# Patient Record
Sex: MALE | Race: WHITE
[De-identification: names, ages, dates, MRNs, and addresses within clinical notes are randomized per-mention and may not be internally consistent; named-entity substitution may affect disease eponyms.]

---

## 2017-06-12 ENCOUNTER — HOSPITAL ENCOUNTER (OUTPATIENT)
Dept: HOSPITAL 45 - C.LAB1850 | Age: 42
End: 2017-06-12
Attending: NURSE PRACTITIONER
Payer: COMMERCIAL

## 2017-06-12 DIAGNOSIS — R19.7: Primary | ICD-10-CM

## 2017-06-12 LAB
ALBUMIN/GLOB SERPL: 1 {RATIO} (ref 0.9–2)
ALP SERPL-CCNC: 113 U/L (ref 45–117)
ALT SERPL-CCNC: 120 U/L (ref 12–78)
ANION GAP SERPL CALC-SCNC: 10 MMOL/L (ref 3–11)
AST SERPL-CCNC: 64 U/L (ref 15–37)
BASOPHILS # BLD: 0.05 K/UL (ref 0–0.2)
BASOPHILS NFR BLD: 0.5 %
BUN SERPL-MCNC: 6 MG/DL (ref 7–18)
BUN/CREAT SERPL: 6.4 (ref 10–20)
CALCIUM SERPL-MCNC: 8.5 MG/DL (ref 8.5–10.1)
CHLORIDE SERPL-SCNC: 105 MMOL/L (ref 98–107)
CO2 SERPL-SCNC: 25 MMOL/L (ref 21–32)
COMPLETE: YES
CREAT SERPL-MCNC: 0.91 MG/DL (ref 0.6–1.4)
CRP SERPL-MCNC: 0.75 MG/DL (ref 0–0.29)
EOSINOPHIL NFR BLD AUTO: 224 K/UL (ref 130–400)
GLOBULIN SER-MCNC: 3.7 GM/DL (ref 2.5–4)
GLUCOSE SERPL-MCNC: 105 MG/DL (ref 70–99)
HCT VFR BLD CALC: 46.5 % (ref 42–52)
IG%: 0.2 %
IMM GRANULOCYTES NFR BLD AUTO: 27.4 %
LYMPHOCYTES # BLD: 2.63 K/UL (ref 1.2–3.4)
MCH RBC QN AUTO: 30.7 PG (ref 25–34)
MCHC RBC AUTO-ENTMCNC: 35.5 G/DL (ref 32–36)
MCV RBC AUTO: 86.4 FL (ref 80–100)
MONOCYTES NFR BLD: 5.1 %
NEUTROPHILS # BLD AUTO: 1.6 %
NEUTROPHILS NFR BLD AUTO: 65.2 %
PMV BLD AUTO: 9.1 FL (ref 7.4–10.4)
POTASSIUM SERPL-SCNC: 3.8 MMOL/L (ref 3.5–5.1)
RBC # BLD AUTO: 5.38 M/UL (ref 4.7–6.1)
SODIUM SERPL-SCNC: 140 MMOL/L (ref 136–145)
TSH SERPL-ACNC: 1.87 UIU/ML (ref 0.3–4.5)
WBC # BLD AUTO: 9.6 K/UL (ref 4.8–10.8)

## 2017-06-15 LAB
IGA SERPL-MCNC: 239 MG/DL (ref 81–463)
TIS TRANS IGA: 1 U/ML (ref ?–4)

## 2017-06-26 ENCOUNTER — HOSPITAL ENCOUNTER (OUTPATIENT)
Dept: HOSPITAL 45 - C.GI | Age: 42
Discharge: HOME | End: 2017-06-26
Attending: INTERNAL MEDICINE
Payer: COMMERCIAL

## 2017-06-26 VITALS
HEIGHT: 70 IN | BODY MASS INDEX: 41.6 KG/M2 | BODY MASS INDEX: 41.6 KG/M2 | WEIGHT: 290.61 LBS | WEIGHT: 290.61 LBS | HEIGHT: 70 IN

## 2017-06-26 VITALS — HEART RATE: 70 BPM | DIASTOLIC BLOOD PRESSURE: 72 MMHG | SYSTOLIC BLOOD PRESSURE: 127 MMHG | OXYGEN SATURATION: 98 %

## 2017-06-26 DIAGNOSIS — Z98.818: ICD-10-CM

## 2017-06-26 DIAGNOSIS — E78.5: ICD-10-CM

## 2017-06-26 DIAGNOSIS — E66.9: ICD-10-CM

## 2017-06-26 DIAGNOSIS — Z87.891: ICD-10-CM

## 2017-06-26 DIAGNOSIS — D12.3: ICD-10-CM

## 2017-06-26 DIAGNOSIS — H40.9: ICD-10-CM

## 2017-06-26 DIAGNOSIS — Z98.52: ICD-10-CM

## 2017-06-26 DIAGNOSIS — K52.9: Primary | ICD-10-CM

## 2017-06-26 DIAGNOSIS — K64.8: ICD-10-CM

## 2017-06-26 DIAGNOSIS — K62.1: ICD-10-CM

## 2017-06-26 NOTE — DISCHARGE INSTRUCTIONS
Endoscopy Patient Instructions


Date / Procedure(s) Performed


Jun 26, 2017.


Colonoscopy





Allergy Information


Coded Allergies:  


     No Known Allergies (Unverified , 6/26/17)





Discharge Date / Findings


Jun 26, 2017.


Colon polyps


Internal Hemorrhoids


Random colon biopsies


Stool studies collected





Medication Instructions


OK to resume all medications today as prescribed





Reported Home Medications








 Medications  Dose


 Route/Sig


 Max Daily Dose Days Date Category


 


 No Active


 Prescriptions or


 Reported


 Medications  


 


     Rx











Provider Instructions





Activity Restrictions





-  No exercising or heavy lifting for 24 hours. 


-  Do not drink alcohol the day of the procedure.


-  Do not drive a car or operate machinery until the day after the procedure.


-  Do not make any important decisions or sign important papers in 24 hours 

after the procedure.





Following Day:





-  Return to full activity which may include returning to work/school.





Diet





Start your diet with liquids and light foods (jello, soup, juice, toast).  Then 

eat your usual diet if not nauseated.





Treatment For Common After Affects





For mild abdominal pain, bloating, or excessive gas:





-  Rest


-  Eat lightly


-  Lie on right side





Follow-Up Information


Follow-up with Dr. Condon as scheduled





Anesthesia Information





What You Should Know





You have had a procedure that required some medicine to reduce anxiety and 

discomfort. This treatment is called moderate sedation.  


After receiving the treatment, you may be sleepy, but you will be able to 

breathe on your own.  The effects of the treatment may last for several hours.








Follow these instructions along with Activity/Diet recommendations noted above:





*  Do NOT do anything where dizziness or clumsiness would be dangerous.





*  Rest quietly at home today, then you can be up and about tomorrow.





*  Have a responsible person stay with you the rest of today.





*  You may have had an I.V. today.  If so, you may take the dressing off later 

today.





Recommendations


 


Call your doctor if:





*  Trouble breathing 





*  Continuous vomiting for more than 24 hours








*  Temperature above 101 degrees





*  Severe abdominal pain or bloating





*  Pain not relieved by pain medicine ordered





*  There is increased drainage or redness from any incision





*  A large amount of rectal bleeding greater than 2-3 tablespoons. 


   (If you had a polyp/s removed or have hemorrhoids, a small amount of blood -


    from the rectum is to be expected.)





*  You have any unanswered questions or concerns.








IN THE EVENT OF A SERIOUS EMERGENCY, GO TO THE NEAREST EMERGENCY ROOM








       Your discharge instructions were prepared by provider Nate Capone.





 Patient Instructions Signature Page














Guillermo Shaffer 











Patient (or Guardian) Signature/Date:____________________________________ I 

have read and understand the instructions given to me by my caregivers.








Caregiver/RN/Doctor Signature/Date:____________________________________











The above-named patient and/or guardian has received patient instructions on 

this date.





























+  Original Patient Signature Page (only) stays with chart.  Please make copy 

for patient.

## 2017-06-26 NOTE — ANESTHESIOLOGY PROGRESS NOTE
Anesthesia Post Op Note


Date & Time


Jun 26, 2017 at 12:50





Vital Signs


Pain Intensity:  0





Vital Signs Past 12 Hours








  Date Time  Temp Pulse Resp B/P (MAP) Pulse Ox O2 Delivery O2 Flow Rate FiO2


 


6/26/17 12:40  70 18 118/86 (97) 97 Room Air  


 


6/26/17 12:25  76 18 119/65 (83) 95 Room Air  


 


6/26/17 12:25  76 18 119/65 (83) 96 Room Air  


 


6/26/17 10:57 36.5 84 20 149/92 (111) 96 Room Air  











Notes


Mental Status:  alert / awake / arousable, participated in evaluation


Pt Amnestic to Procedure:  Yes


Nausea / Vomiting:  adequately controlled


Pain:  adequately controlled


Airway Patency, RR, SpO2:  stable & adequate


BP & HR:  stable & adequate


Hydration State:  stable & adequate


Anesthetic Complications:  no major complications apparent

## 2017-06-26 NOTE — ENDO HISTORY AND PHYSICAL
History & Physical


Date of Service:


Jun 26, 2017.


Chief Complaint:


Diarrhea


Referring Physician:


Dr. Condon


History of Present Illness


43 yo CM who presents for colonoscopy secondary to diarrhea.





Past Surgical History


Hx Cardiac Surgery:  No


Hx Internal Defibrillator:  No


Hx Pacemaker:  No


Hx Abdominal Surgery:  No


Hx of Implantable Prosthesis:  No


Hx Post-Op Nausea and Vomiting:  No


Hx Cancer Surgery:  No


Hx Thoracic Surgery:  No


Hx Orthopedic:  No


Hx Urinary Tract Surgery:  Yes (VASECTOMY)





Family History


None





Social History


Smoking Status:  Former Smoker


Hx Substance Use:  No


Hx Alcohol Use:  No





Allergies


Coded Allergies:  


     No Known Allergies (Unverified , 6/26/17)





Current Medications





Reported Home Medications








 Medications  Dose


 Route/Sig


 Max Daily Dose Days Date Category


 


 No Active


 Prescriptions or


 Reported


 Medications  


 


     Rx











Vital Signs


Weight (Kilograms):  131.82


Height (Feet):  5


Height (Inches):  10











  Date Time  Temp Pulse Resp B/P (MAP) Pulse Ox O2 Delivery O2 Flow Rate FiO2


 


6/26/17 10:57 36.5 84 20 149/92 (111) 96 Room Air  











Physical Exam


General Appearance:  WD/WN, no apparent distress


Respiratory/Chest:  


   Auscultation:  breath sounds normal


Cardiovascular:  


   Heart Auscultation:  RRR


Abdomen:  


   Bowel Sounds:  normal


   Inspection & Palpation:  soft, non-distended, no tenderness, guarding & 

rebound





Assessment and Plan


Assessment:


43 yo CM who presents for colonoscopy secondary to diarrhea.








Plan:


Proceed with colonoscopy.

## 2017-06-26 NOTE — GI REPORT
Procedure Date: 6/26/2017 12:03 PM

Procedure:            Colonoscopy

Indications:          Chronic diarrhea

Medicines:            Monitored Anesthesia Care

Complications:        No immediate complications.

Estimated Blood Loss: Estimated blood loss: none.

Procedure:            Pre-Anesthesia Assessment:

                      - Prior to the procedure, a History and Physical was 

                      performed, and patient medications and allergies were 

                      reviewed. The patient's tolerance of previous 

                      anesthesia was also reviewed. The risks and benefits of 

                      the procedure and the sedation options and risks were 

                      discussed with the patient. All questions were 

                      answered, and informed consent was obtained. Prior 

                      Anticoagulants: The patient has taken no previous 

                      anticoagulant or antiplatelet agents. ASA Grade 

                      Assessment: III - A patient with severe systemic 

                      disease. After reviewing the risks and benefits, the 

                      patient was deemed in satisfactory condition to undergo 

                      the procedure.

                      After I obtained informed consent, the scope was passed 

                      under direct vision. Throughout the procedure, the 

                      patient's blood pressure, pulse, and oxygen saturations 

                      were monitored continuously. The scope was introduced 

                      through the anus and advanced to the terminal ileum. 

                      The colonoscopy was performed without difficulty. The 

                      patient tolerated the procedure well. The quality of 

                      the bowel preparation was good. The terminal ileum, 

                      ileocecal valve, appendiceal orifice, and rectum were 

                      photographed.

Findings:

     Two sessile polyps were found in the rectum and in the transverse colon. 

     The polyps were 5 to 7 mm in size. These polyps were removed with a hot 

     snare. Resection and retrieval were complete.

     Non-bleeding internal hemorrhoids were found during retroflexion. The 

     hemorrhoids were small.

     Several random biopsies were obtained with cold forceps for histology in 

     the entire colon.

     Fluid aspiration for cytology was performed in the entire colon.

Impression:           - Two 5 to 7 mm polyps in the rectum and in the 

                      transverse colon, removed with a hot snare. Resected 

                      and retrieved.

                      - Non-bleeding internal hemorrhoids.

                      - Several random biopsies were obtained in the entire 

                      colon.

                      - Fluid aspiration was performed.

Recommendation:       - Resume previous diet.

                      - Continue present medications.

                      - Await pathology results.

                      - Repeat colonoscopy for surveillance based on 

                      pathology results.

                      - Return to primary care physician as previously 

                      scheduled.

Nate Capone DO

6/26/2017 12:34:47 PM

This report has been signed electronically.

Note Initiated On: 6/26/2017 12:03 PM

     I attest to the content of the Intraoperative Record and orders 

     documented therein, exceptions below

## 2018-03-13 ENCOUNTER — HOSPITAL ENCOUNTER (EMERGENCY)
Dept: HOSPITAL 45 - C.EDB | Age: 43
Discharge: HOME | End: 2018-03-13
Payer: COMMERCIAL

## 2018-03-13 VITALS
HEIGHT: 70 IN | BODY MASS INDEX: 41.28 KG/M2 | WEIGHT: 288.36 LBS | BODY MASS INDEX: 41.28 KG/M2 | HEIGHT: 70 IN | WEIGHT: 288.36 LBS

## 2018-03-13 VITALS — SYSTOLIC BLOOD PRESSURE: 118 MMHG | OXYGEN SATURATION: 94 % | HEART RATE: 66 BPM | DIASTOLIC BLOOD PRESSURE: 77 MMHG

## 2018-03-13 VITALS — TEMPERATURE: 98.06 F

## 2018-03-13 VITALS — OXYGEN SATURATION: 90 %

## 2018-03-13 DIAGNOSIS — Z91.030: ICD-10-CM

## 2018-03-13 DIAGNOSIS — R07.9: Primary | ICD-10-CM

## 2018-03-13 DIAGNOSIS — Z87.891: ICD-10-CM

## 2018-03-13 LAB
ALBUMIN SERPL-MCNC: 4 GM/DL (ref 3.4–5)
ALP SERPL-CCNC: 119 U/L (ref 45–117)
ALT SERPL-CCNC: 74 U/L (ref 12–78)
AST SERPL-CCNC: 46 U/L (ref 15–37)
BASOPHILS # BLD: 0.06 K/UL (ref 0–0.2)
BASOPHILS NFR BLD: 0.7 %
BUN SERPL-MCNC: 5 MG/DL (ref 7–18)
CALCIUM SERPL-MCNC: 9.1 MG/DL (ref 8.5–10.1)
CO2 SERPL-SCNC: 27 MMOL/L (ref 21–32)
CREAT SERPL-MCNC: 1.01 MG/DL (ref 0.6–1.4)
EOS ABS #: 0.22 K/UL (ref 0–0.5)
EOSINOPHIL NFR BLD AUTO: 217 K/UL (ref 130–400)
GLUCOSE SERPL-MCNC: 127 MG/DL (ref 70–99)
HCT VFR BLD CALC: 48.8 % (ref 42–52)
HGB BLD-MCNC: 17.6 G/DL (ref 14–18)
IG#: 0.02 K/UL (ref 0–0.02)
IMM GRANULOCYTES NFR BLD AUTO: 28.6 %
INR PPP: 1 (ref 0.9–1.1)
LIPASE: 168 U/L (ref 73–393)
LYMPHOCYTES # BLD: 2.46 K/UL (ref 1.2–3.4)
MCH RBC QN AUTO: 30.6 PG (ref 25–34)
MCHC RBC AUTO-ENTMCNC: 36.1 G/DL (ref 32–36)
MCV RBC AUTO: 84.9 FL (ref 80–100)
MONO ABS #: 0.49 K/UL (ref 0.11–0.59)
MONOCYTES NFR BLD: 5.7 %
NEUT ABS #: 5.36 K/UL (ref 1.4–6.5)
NEUTROPHILS # BLD AUTO: 2.6 %
NEUTROPHILS NFR BLD AUTO: 62.2 %
PMV BLD AUTO: 8.8 FL (ref 7.4–10.4)
POTASSIUM SERPL-SCNC: 3.7 MMOL/L (ref 3.5–5.1)
PROT SERPL-MCNC: 7.7 GM/DL (ref 6.4–8.2)
PTT PATIENT: 26 SECONDS (ref 21–31)
RED CELL DISTRIBUTION WIDTH CV: 13.5 % (ref 11.5–14.5)
RED CELL DISTRIBUTION WIDTH SD: 41.3 FL (ref 36.4–46.3)
SODIUM SERPL-SCNC: 136 MMOL/L (ref 136–145)
WBC # BLD AUTO: 8.61 K/UL (ref 4.8–10.8)

## 2018-03-13 NOTE — DIAGNOSTIC IMAGING REPORT
CHEST ONE VIEW PORTABLE



CLINICAL HISTORY: CHEST PAIN pain



COMPARISON STUDY:  5 2014



FINDINGS: Mild cardiomegaly. Lungs are clear. Diaphragms smooth. 



IMPRESSION:  Mild cardiomegaly. Otherwise negative study. 











The above report was generated using voice recognition software.  It may contain

grammatical, syntax or spelling errors.









Electronically signed by:  Shin Lamas M.D.

3/13/2018 10:00 AM



Dictated Date/Time:  3/13/2018 10:00 AM

## 2018-03-13 NOTE — EMERGENCY ROOM VISIT NOTE
History


Report prepared by Robin:  Paco Rosenbaum


Under the Supervision of:  Dr. Ramakrishna Hu D.O.


First contact with patient:  09:23


Chief Complaint:  CHEST PAIN


Stated Complaint:  CHEST PAIN


Nursing Triage Summary:  


pt reports awaking around 0630 - 0700 and having episodes of severe chest pain 


lasting seconds coming every couple mins, center of chest non radiating 





pain takes breath away 





nausea with pain





History of Present Illness


The patient is a 42 year old male who presents to the Emergency Room with 

complaints of intermittent left chest pain since 0700 this morning. He feels 

short of breath. He reports nausea. He notes the chest pain went away and then 

came back. The pain does not radiate. He describes the pain as sharp. The pain 

is not worsened by any movement or breathing. He states that he has never had 

these symptoms before. He has not seen his PCP for these symptoms. He does not 

take any medications. He denies any history of HTN, DM, or history of cardiac 

problems. He denies any family history of CAD. He denies any issues with eating 

or drinking. He denies any alcohol use. He is a former smoker.





   Source of History:  patient


   Onset:  0700 this morning


   Position:  chest


   Quality:  sharp


   Timing:  intermittent


   Associated Symptoms:  + nausea





Review of Systems


See HPI for pertinent positives & negatives. A total of 10 systems reviewed and 

were otherwise negative.





Past Medical & Surgical


Medical Problems:


(1) No Known Active Medical Problems








Family History





No significant family history





Social History


Smoking Status:  Former Smoker


Smokeless Tobacco Use:  No


Alcohol Use:  none


Drug Use:  none


Occupation Status:  employed





Current/Historical Medications


Scheduled


Omeprazole (Prilosec), 20 MG PO DAILY





Allergies


Coded Allergies:  


     BEE STING (Unverified  Allergy, Unknown, SWELLING, 3/13/18)





Physical Exam


Vital Signs











  Date Time  Temp Pulse Resp B/P (MAP) Pulse Ox O2 Delivery O2 Flow Rate FiO2


 


3/13/18 13:01  66 18 118/77 94 Room Air  


 


3/13/18 11:41  65 18 138/76 93 Room Air  


 


3/13/18 09:59  72 20 140/92 92 Room Air  


 


3/13/18 09:34  82      


 


3/13/18 09:28      Room Air  


 


3/13/18 09:28 36.7 80 20 144/89 92 Room Air  


 


3/13/18 09:27     90 Room Air  











Physical Exam


GENERAL:  Patient is awake, alert, and in no acute distress. Patient is resting 

comfortably and showing no signs of anxiety


EYES: The conjunctivae are clear.  The pupils are round and reactive. 


EARS, NOSE, MOUTH AND THROAT: The nose is without any evidence of any 

deformity. Mucous membranes are moist tongue is midline 


NECK: The neck is nontender and supple.


RESPIRATORY: Normal respiratory effort is noted there is no evidence of 

wheezing rhonchi or rales


CARDIOVASCULAR:  Regular rate and rhythm noted there no murmurs rubs or gallops 

normal S1 normal S2 


GASTROINTESTINAL: The abdomen is soft. Bowel sounds are present in all 

quadrants. Abdomen is nontender


MUSCULOSKELETAL/EXTREMITIES: There is no evidence of gross deformity full range 

of motion is noted in the hips and shoulders


SKIN: There is no obvious evidence of any rash. There are no petechiae, pallor 

or cyanosis noted. 


NEUROLOGIC:  Patient is awake alert and oriented x3.





Medical Decision & Procedures


ER Provider


Diagnostic Interpretation:


Radiology results as stated below per my review and radiologist interpretation:





CHEST ONE VIEW PORTABLE





CLINICAL HISTORY: CHEST PAIN pain





COMPARISON STUDY:  5 2014





FINDINGS: Mild cardiomegaly. Lungs are clear. Diaphragms smooth. 





IMPRESSION:  Mild cardiomegaly. Otherwise negative study. 

















The above report was generated using voice recognition software.  It may contain


grammatical, syntax or spelling errors.














Electronically signed by:  Shin Lamas M.D.


3/13/2018 10:00 AM





Dictated Date/Time:  3/13/2018 10:00 AM





Laboratory Results


3/13/18 09:25








Red Blood Count 5.75, Mean Corpuscular Volume 84.9, Mean Corpuscular Hemoglobin 

30.6, Mean Corpuscular Hemoglobin Concent 36.1, Mean Platelet Volume 8.8, 

Neutrophils (%) (Auto) 62.2, Lymphocytes (%) (Auto) 28.6, Monocytes (%) (Auto) 

5.7, Eosinophils (%) (Auto) 2.6, Basophils (%) (Auto) 0.7, Neutrophils # (Auto) 

5.36, Lymphocytes # (Auto) 2.46, Monocytes # (Auto) 0.49, Eosinophils # (Auto) 

0.22, Basophils # (Auto) 0.06





3/13/18 09:25

















Test


  3/13/18


09:25 3/13/18


11:40


 


White Blood Count


  8.61 K/uL


(4.8-10.8) 


 


 


Red Blood Count


  5.75 M/uL


(4.7-6.1) 


 


 


Hemoglobin


  17.6 g/dL


(14.0-18.0) 


 


 


Hematocrit 48.8 % (42-52)  


 


Mean Corpuscular Volume


  84.9 fL


() 


 


 


Mean Corpuscular Hemoglobin


  30.6 pg


(25-34) 


 


 


Mean Corpuscular Hemoglobin


Concent 36.1 g/dl


(32-36) 


 


 


Platelet Count


  217 K/uL


(130-400) 


 


 


Mean Platelet Volume


  8.8 fL


(7.4-10.4) 


 


 


Neutrophils (%) (Auto) 62.2 %  


 


Lymphocytes (%) (Auto) 28.6 %  


 


Monocytes (%) (Auto) 5.7 %  


 


Eosinophils (%) (Auto) 2.6 %  


 


Basophils (%) (Auto) 0.7 %  


 


Neutrophils # (Auto)


  5.36 K/uL


(1.4-6.5) 


 


 


Lymphocytes # (Auto)


  2.46 K/uL


(1.2-3.4) 


 


 


Monocytes # (Auto)


  0.49 K/uL


(0.11-0.59) 


 


 


Eosinophils # (Auto)


  0.22 K/uL


(0-0.5) 


 


 


Basophils # (Auto)


  0.06 K/uL


(0-0.2) 


 


 


RDW Standard Deviation


  41.3 fL


(36.4-46.3) 


 


 


RDW Coefficient of Variation


  13.5 %


(11.5-14.5) 


 


 


Immature Granulocyte % (Auto) 0.2 %  


 


Immature Granulocyte # (Auto)


  0.02 K/uL


(0.00-0.02) 


 


 


Prothrombin Time


  10.7 SECONDS


(9.0-12.0) 


 


 


Prothromb Time International


Ratio 1.0 (0.9-1.1) 


  


 


 


Activated Partial


Thromboplast Time 26.0 SECONDS


(21.0-31.0) 


 


 


Partial Thromboplastin Ratio 1.0  


 


D-Dimer


  230 ug/L FEU


(0-500) 


 


 


Anion Gap


  6.0 mmol/L


(3-11) 


 


 


Est Creatinine Clear Calc


Drug Dose 128.2 ml/min 


  


 


 


Estimated GFR (


American) 105.1 


  


 


 


Estimated GFR (Non-


American 90.7 


  


 


 


BUN/Creatinine Ratio 5.0 (10-20)  


 


Calcium Level


  9.1 mg/dl


(8.5-10.1) 


 


 


Total Bilirubin


  0.5 mg/dl


(0.2-1) 


 


 


Direct Bilirubin


  0.1 mg/dl


(0-0.2) 


 


 


Aspartate Amino Transf


(AST/SGOT) 46 U/L (15-37) 


  


 


 


Alanine Aminotransferase


(ALT/SGPT) 74 U/L (12-78) 


  


 


 


Alkaline Phosphatase


  119 U/L


() 


 


 


Total Protein


  7.7 gm/dl


(6.4-8.2) 


 


 


Albumin


  4.0 gm/dl


(3.4-5.0) 


 


 


Lipase


  168 U/L


() 


 


 


Troponin I


  


  < 0.015 ng/ml


(0-0.045)





Laboratory results per my review.





ECG Per My Interpretation


Indication:  chest pain


Rate (beats per minute):  83


Rhythm:  normal sinus


Findings:  no acute ischemic change, no ectopy (No PVCs)


Change:


Repeat ECG: Normal Sinus Rhythm 66 bpm


Findings: No ectopy. No acute ischemic changes. No changes from prior.





ED Course


0936: The patient was evaluated in room A4B. A complete history and physical 

examination were performed. 





1226: I reassessed the patient at this time. He is feeling better and resting 

comfortably. I discussed the results and treatment plan with the patient. I 

answered all pertaining questions that he had. He expressed understanding and 

verbalized agreement. The patient will be discharged home.





Medical Decision


Prior records/ancillary studies reviewed.


Triage Nursing notes reviewed.





The patient's history was concerning for chest pain. 





Differential diagnosis:


Etiologies such as cardiac ischemia, aortic dissection, pulmonary embolism, 

pneumonia, pneumothorax, musculoskeletal, infections, pericarditis, myocarditis

, esophageal rupture, gastrointestinal, as well as others were entertained. 





The patient is a 42-year-old male who presented to the emergency department for 

evaluation of chest pain.  The patient has had ongoing chest pain through the 

day.  The patient had serial EKGs as well as serial troponin measurements in 

the emergency department.  I discussed the patient's laboratory and 

radiographic studies with him.  I also discussed the limitations of the 

emergency department workup for chest pain with him.  At this time I feel his 

likelihood of acute coronary syndrome is low.  I have recommended that he rest 

and avoid any strenuous activity.  He was also encouraged to continue all 

medications as prescribed and follow-up with his primary care physician as soon 

as possible.  Otherwise he was encouraged to return the emergency department 

immediately if symptoms change worsen or the need arises.





Medication Reconcilliation


Current Medication List:  was personally reviewed by me





Blood Pressure Screening


Patient's blood pressure:  Elevated blood pressure


Blood pressure disposition:  Elevated BP felt to be situational





Impression





 Primary Impression:  


 Left sided chest pain





Scribe Attestation


The scribe's documentation has been prepared under my direction and personally 

reviewed by me in its entirety. I confirm that the note above accurately 

reflects all work, treatment, procedures, and medical decision making performed 

by me.





Departure Information


Dispostion


Home / Self-Care





Prescriptions





Omeprazole (Prilosec) 20 Mg Capcr


20 MG PO DAILY, #30 CAP


   Prov: Ramakrishna Hu, DO         3/13/18





Referrals


Ramakrishna Condon M.D. (PCP)





Forms


Call Back Authorization, HOME CARE DOCUMENTATION FORM,                         

                                       IMPORTANT VISIT INFORMATION





Patient Instructions


ED Chest Pain Atypical Unkn Cause, My [facility]





Additional Instructions





Continue all medications as prescribed.  Consider using Maalox or Mylanta as 

directed for symptomatic relief.  Avoid any strenuous activity.  Call your 

family doctor to schedule a follow-up appointment.  You may require further 

studies such as a stress test to further evaluate the cause of your discomfort.

  Return to the emergency department immediately if symptoms change worsening 

the need arises.